# Patient Record
Sex: FEMALE | URBAN - METROPOLITAN AREA
[De-identification: names, ages, dates, MRNs, and addresses within clinical notes are randomized per-mention and may not be internally consistent; named-entity substitution may affect disease eponyms.]

---

## 2018-05-29 ENCOUNTER — APPOINTMENT (RX ONLY)
Dept: URBAN - METROPOLITAN AREA CLINIC 73 | Facility: CLINIC | Age: 38
Setting detail: DERMATOLOGY
End: 2018-05-29

## 2018-05-29 DIAGNOSIS — L82.1 OTHER SEBORRHEIC KERATOSIS: ICD-10-CM

## 2018-05-29 DIAGNOSIS — L82.0 INFLAMED SEBORRHEIC KERATOSIS: ICD-10-CM

## 2018-05-29 PROCEDURE — 99202 OFFICE O/P NEW SF 15 MIN: CPT | Mod: 25

## 2018-05-29 PROCEDURE — ? COUNSELING

## 2018-05-29 PROCEDURE — ? ADDITIONAL PATHOLOGY SPECIMEN

## 2018-05-29 PROCEDURE — ? PATIENT SPECIFIC COUNSELING

## 2018-05-29 PROCEDURE — 17110 DESTRUCTION B9 LES UP TO 14: CPT

## 2018-05-29 PROCEDURE — ? BENIGN DESTRUCTION

## 2018-05-29 ASSESSMENT — LOCATION SIMPLE DESCRIPTION DERM
LOCATION SIMPLE: LEFT EYELID
LOCATION SIMPLE: RIGHT CHEEK
LOCATION SIMPLE: RIGHT CHEEK
LOCATION SIMPLE: LEFT CHEEK

## 2018-05-29 ASSESSMENT — LOCATION DETAILED DESCRIPTION DERM
LOCATION DETAILED: RIGHT CENTRAL MALAR CHEEK
LOCATION DETAILED: LEFT LATERAL MALAR CHEEK
LOCATION DETAILED: RIGHT CENTRAL MALAR CHEEK
LOCATION DETAILED: LEFT LATERAL CANTHUS

## 2018-05-29 ASSESSMENT — LOCATION ZONE DERM
LOCATION ZONE: FACE
LOCATION ZONE: FACE
LOCATION ZONE: EYELID

## 2018-05-29 NOTE — HPI: SKIN LESIONS
Is This A New Presentation, Or A Follow-Up?: Skin Lesions
How Severe Is Your Skin Lesion?: moderate
Additional History: Patient would like to discuss treatment of lesions across the face.

## 2018-05-29 NOTE — PROCEDURE: ADDITIONAL PATHOLOGY SPECIMEN
Billing Type: Third-Party Bill
Lab: [id number]
Detail Level: Detailed
X Size Of Lesion In Cm (Optional): 0
Biopsy Type: H and E (Permanent Final Margin)
Lab Facility: [id number]
Notification Instructions: Patient will be notified of biopsy results. However, patient instructed to call the office if not contacted within 2 weeks.

## 2018-05-29 NOTE — PROCEDURE: BENIGN DESTRUCTION
Post-Care Instructions: I reviewed with the patient in detail post-care instructions. Patient is to wear sunprotection, and avoid picking at any of the treated lesions. Pt may apply Vaseline to crusted or scabbing areas.
Include Z78.9 (Other Specified Conditions Influencing Health Status) As An Associated Diagnosis?: No
Anesthesia Volume In Cc: 0.5
Render Post-Care Instructions In Note?: yes
Medical Necessity Clause: This procedure was medically necessary because the lesions that were treated were: inflamed, irritated, tender
Treatment Number (Will Not Render If 0): 0
Anesthesia Type: 1% lidocaine with epinephrine
Consent: The patient's consent was obtained including but not limited to risks of crusting, scabbing, blistering, scarring, darker or lighter pigmentary change, recurrence, incomplete removal and infection.
Detail Level: Detailed
Medical Necessity Information: It is in your best interest to select a reason for this procedure from the list below. All of these items fulfill various CMS LCD requirements except the new and changing color options.
Treatment Number (Will Not Render If 0): 1

## 2018-05-29 NOTE — HPI: SKIN LESION
Is This A New Presentation, Or A Follow-Up?: Skin Lesion
How Severe Is Your Skin Lesion?: moderate
Additional History: Patient states the lesion has been changing shape and color in 2 weeks. Patient is present with spouse.

## 2018-05-29 NOTE — PROCEDURE: PATIENT SPECIFIC COUNSELING
-informed patient treatment options for removal include: LN2 vs ED&C\\n-recommended LN2 due to lower chance of a scar, may require multiple treatments\\n-patient opted for ED&C with pathology\\n-patient understands lesion has a smaller chance of returning with ED&C\\n-wound care instructions were given to patient\\n-Vaseline and bandaid was applied today\\n-patient will RTC in one month if lesion by left canthus is still present
Detail Level: Zone